# Patient Record
Sex: MALE | Race: WHITE | HISPANIC OR LATINO | Employment: OTHER | RURAL
[De-identification: names, ages, dates, MRNs, and addresses within clinical notes are randomized per-mention and may not be internally consistent; named-entity substitution may affect disease eponyms.]

---

## 2024-05-15 ENCOUNTER — HOSPITAL ENCOUNTER (EMERGENCY)
Facility: HOSPITAL | Age: 64
Discharge: HOME | End: 2024-05-15
Attending: EMERGENCY MEDICINE
Payer: COMMERCIAL

## 2024-05-15 VITALS
SYSTOLIC BLOOD PRESSURE: 154 MMHG | BODY MASS INDEX: 33.86 KG/M2 | OXYGEN SATURATION: 99 % | HEIGHT: 72 IN | HEART RATE: 84 BPM | DIASTOLIC BLOOD PRESSURE: 80 MMHG | RESPIRATION RATE: 18 BRPM | WEIGHT: 250 LBS | TEMPERATURE: 99 F

## 2024-05-15 DIAGNOSIS — N20.1 URETEROLITHIASIS: Primary | ICD-10-CM

## 2024-05-15 DIAGNOSIS — E86.0 DEHYDRATION: ICD-10-CM

## 2024-05-15 LAB
ALBUMIN SERPL BCP-MCNC: 3.9 G/DL (ref 3.5–5)
ALBUMIN/GLOB SERPL: 1.1 {RATIO}
ALP SERPL-CCNC: 115 U/L (ref 45–115)
ALT SERPL W P-5'-P-CCNC: 35 U/L (ref 16–61)
ANION GAP SERPL CALCULATED.3IONS-SCNC: 15 MMOL/L (ref 7–16)
AST SERPL W P-5'-P-CCNC: 29 U/L (ref 15–37)
BACTERIA #/AREA URNS HPF: ABNORMAL /HPF
BASOPHILS # BLD AUTO: 0.04 K/UL (ref 0–0.2)
BASOPHILS NFR BLD AUTO: 0.4 % (ref 0–1)
BILIRUB SERPL-MCNC: 0.8 MG/DL (ref ?–1.2)
BILIRUB UR QL STRIP: ABNORMAL
BUN SERPL-MCNC: 15 MG/DL (ref 7–18)
BUN/CREAT SERPL: 11 (ref 6–20)
CALCIUM CARBONATE CRYSTALS, UA: ABNORMAL /LPF
CALCIUM SERPL-MCNC: 8.5 MG/DL (ref 8.5–10.1)
CAOX CRY #/AREA URNS LPF: ABNORMAL /LPF
CHLORIDE SERPL-SCNC: 102 MMOL/L (ref 98–107)
CLARITY UR: ABNORMAL
CO2 SERPL-SCNC: 25 MMOL/L (ref 21–32)
COLOR UR: ABNORMAL
CREAT SERPL-MCNC: 1.38 MG/DL (ref 0.7–1.3)
DIFFERENTIAL METHOD BLD: ABNORMAL
EGFR (NO RACE VARIABLE) (RUSH/TITUS): 58 ML/MIN/1.73M2
EOSINOPHIL # BLD AUTO: 0.32 K/UL (ref 0–0.5)
EOSINOPHIL NFR BLD AUTO: 3.4 % (ref 1–4)
ERYTHROCYTE [DISTWIDTH] IN BLOOD BY AUTOMATED COUNT: 13.1 % (ref 11.5–14.5)
GLOBULIN SER-MCNC: 3.6 G/DL (ref 2–4)
GLUCOSE SERPL-MCNC: 162 MG/DL (ref 74–106)
GLUCOSE UR STRIP-MCNC: NEGATIVE MG/DL
HCT VFR BLD AUTO: 47.5 % (ref 40–54)
HGB BLD-MCNC: 16.1 G/DL (ref 13.5–18)
KETONES UR STRIP-SCNC: 15 MG/DL
LEUKOCYTE ESTERASE UR QL STRIP: NEGATIVE
LYMPHOCYTES # BLD AUTO: 1.52 K/UL (ref 1–4.8)
LYMPHOCYTES NFR BLD AUTO: 16.4 % (ref 27–41)
MCH RBC QN AUTO: 29.5 PG (ref 27–31)
MCHC RBC AUTO-ENTMCNC: 33.9 G/DL (ref 32–36)
MCV RBC AUTO: 87.2 FL (ref 80–96)
MONOCYTES # BLD AUTO: 0.56 K/UL (ref 0–0.8)
MONOCYTES NFR BLD AUTO: 6 % (ref 2–6)
MPC BLD CALC-MCNC: 10.4 FL (ref 9.4–12.4)
NEUTROPHILS # BLD AUTO: 6.84 K/UL (ref 1.8–7.7)
NEUTROPHILS NFR BLD AUTO: 73.8 % (ref 53–65)
NITRITE UR QL STRIP: NEGATIVE
PH UR STRIP: 5.5 PH UNITS
PLATELET # BLD AUTO: 158 K/UL (ref 150–400)
POTASSIUM SERPL-SCNC: 4.4 MMOL/L (ref 3.5–5.1)
PROT SERPL-MCNC: 7.5 G/DL (ref 6.4–8.2)
PROT UR QL STRIP: 100
RBC # BLD AUTO: 5.45 M/UL (ref 4.6–6.2)
RBC # UR STRIP: ABNORMAL /UL
RBC #/AREA URNS HPF: ABNORMAL /HPF
SODIUM SERPL-SCNC: 138 MMOL/L (ref 136–145)
SP GR UR STRIP: >=1.03
SQUAMOUS #/AREA URNS LPF: ABNORMAL /LPF
UROBILINOGEN UR STRIP-ACNC: 1 MG/DL
WBC # BLD AUTO: 9.28 K/UL (ref 4.5–11)
WBC #/AREA URNS HPF: ABNORMAL /HPF

## 2024-05-15 PROCEDURE — 36415 COLL VENOUS BLD VENIPUNCTURE: CPT | Performed by: EMERGENCY MEDICINE

## 2024-05-15 PROCEDURE — 25000003 PHARM REV CODE 250: Performed by: EMERGENCY MEDICINE

## 2024-05-15 PROCEDURE — C9113 INJ PANTOPRAZOLE SODIUM, VIA: HCPCS | Performed by: EMERGENCY MEDICINE

## 2024-05-15 PROCEDURE — 96375 TX/PRO/DX INJ NEW DRUG ADDON: CPT

## 2024-05-15 PROCEDURE — 99284 EMERGENCY DEPT VISIT MOD MDM: CPT | Mod: ,,, | Performed by: EMERGENCY MEDICINE

## 2024-05-15 PROCEDURE — 81001 URINALYSIS AUTO W/SCOPE: CPT | Performed by: EMERGENCY MEDICINE

## 2024-05-15 PROCEDURE — 99284 EMERGENCY DEPT VISIT MOD MDM: CPT | Mod: 25

## 2024-05-15 PROCEDURE — 85025 COMPLETE CBC W/AUTO DIFF WBC: CPT | Performed by: EMERGENCY MEDICINE

## 2024-05-15 PROCEDURE — 63600175 PHARM REV CODE 636 W HCPCS: Performed by: EMERGENCY MEDICINE

## 2024-05-15 PROCEDURE — 96374 THER/PROPH/DIAG INJ IV PUSH: CPT

## 2024-05-15 PROCEDURE — 96361 HYDRATE IV INFUSION ADD-ON: CPT

## 2024-05-15 PROCEDURE — 80053 COMPREHEN METABOLIC PANEL: CPT | Performed by: EMERGENCY MEDICINE

## 2024-05-15 RX ORDER — DILTIAZEM HYDROCHLORIDE EXTENDED-RELEASE TABLETS 420 MG/1
420 TABLET, EXTENDED RELEASE ORAL DAILY
COMMUNITY
End: 2024-05-15 | Stop reason: CLARIF

## 2024-05-15 RX ORDER — DILTIAZEM HYDROCHLORIDE 120 MG/1
120 CAPSULE, COATED, EXTENDED RELEASE ORAL
COMMUNITY
Start: 2024-04-07

## 2024-05-15 RX ORDER — ONDANSETRON 4 MG/1
4 TABLET, FILM COATED ORAL EVERY 8 HOURS PRN
Qty: 10 TABLET | Refills: 0 | Status: SHIPPED | OUTPATIENT
Start: 2024-05-15 | End: 2024-05-18

## 2024-05-15 RX ORDER — NITROFURANTOIN 25; 75 MG/1; MG/1
100 CAPSULE ORAL 2 TIMES DAILY
Qty: 10 CAPSULE | Refills: 0 | Status: SHIPPED | OUTPATIENT
Start: 2024-05-15 | End: 2024-05-20

## 2024-05-15 RX ORDER — KETOROLAC TROMETHAMINE 30 MG/ML
15 INJECTION, SOLUTION INTRAMUSCULAR; INTRAVENOUS
Status: COMPLETED | OUTPATIENT
Start: 2024-05-15 | End: 2024-05-15

## 2024-05-15 RX ORDER — PANTOPRAZOLE SODIUM 40 MG/1
40 TABLET, DELAYED RELEASE ORAL DAILY
Qty: 14 TABLET | Refills: 0 | Status: SHIPPED | OUTPATIENT
Start: 2024-05-15 | End: 2024-05-29

## 2024-05-15 RX ORDER — TAMSULOSIN HYDROCHLORIDE 0.4 MG/1
0.4 CAPSULE ORAL DAILY
Qty: 14 CAPSULE | Refills: 0 | Status: SHIPPED | OUTPATIENT
Start: 2024-05-15 | End: 2024-05-29

## 2024-05-15 RX ORDER — PANTOPRAZOLE SODIUM 40 MG/10ML
40 INJECTION, POWDER, LYOPHILIZED, FOR SOLUTION INTRAVENOUS
Status: COMPLETED | OUTPATIENT
Start: 2024-05-15 | End: 2024-05-15

## 2024-05-15 RX ORDER — ONDANSETRON HYDROCHLORIDE 2 MG/ML
4 INJECTION, SOLUTION INTRAVENOUS
Status: COMPLETED | OUTPATIENT
Start: 2024-05-15 | End: 2024-05-15

## 2024-05-15 RX ORDER — HYDROCODONE BITARTRATE AND ACETAMINOPHEN 5; 325 MG/1; MG/1
1 TABLET ORAL EVERY 6 HOURS PRN
Qty: 20 TABLET | Refills: 0 | Status: SHIPPED | OUTPATIENT
Start: 2024-05-15 | End: 2024-05-20

## 2024-05-15 RX ADMIN — ONDANSETRON 4 MG: 2 INJECTION INTRAMUSCULAR; INTRAVENOUS at 12:05

## 2024-05-15 RX ADMIN — SODIUM CHLORIDE 1000 ML: 9 INJECTION, SOLUTION INTRAVENOUS at 11:05

## 2024-05-15 RX ADMIN — KETOROLAC TROMETHAMINE 15 MG: 30 INJECTION, SOLUTION INTRAMUSCULAR at 12:05

## 2024-05-15 RX ADMIN — PANTOPRAZOLE SODIUM 40 MG: 40 INJECTION, POWDER, LYOPHILIZED, FOR SOLUTION INTRAVENOUS at 12:05

## 2024-05-15 NOTE — DISCHARGE INSTRUCTIONS
INCREASE REST AND FLUIDS,   MEDICATIONS AS DIRECTED: FLOMAX, MACROBID,PROTONIX, NORCO, ZOFRAN  OVER THE COUNTER    TYLENOL FOR FEVER/PAIN,  DISCUSS ASPIRIN THERAPY WITH PCP LATER  NO SODAS  HEAT TO BACK

## 2024-05-15 NOTE — ED PROVIDER NOTES
"Encounter Date: 5/15/2024       History     Chief Complaint   Patient presents with    Abdominal Pain     C/o severe left lower abdominal pain onset 2 hours PTA, some nausea and diaphoresis     PT IS A 64 YR OLD WM WITH LLQ PAIN ONSET 0845 WHILE DRIVING , PT LIVES IN [place], [place] BUT HAS LAND IN [place], [place] AND VISITS OFTEN.   PT HAS NOTED CHILLS BUT DENIES FEVER, HEMATURIA, N/V/D, PALPITATIONS  (HX SVT ON DILTIAZEM ), SYNCOPE OR URINARY SYMPTOMS.  PT  STATES LAST BM THIS AM AND DESCRIBED AS NORMAL.        PT EATS NUTS " A LOT" AND HAS EATEN NUTS THIS WEEK  PT HAD COLOSCOPY NEG IN THE PAST.  PT HAS HX KIDNEY STONES X 3 WITH LAST 15 YR AGO  REQUIRING SURGERY , HAD RENAL CYST        Review of patient's allergies indicates:   Allergen Reactions    Penicillanic sulfone bl beta-lactamase inhibitors      Past Medical History:   Diagnosis Date    Renal calculi     SVT (supraventricular tachycardia)      Past Surgical History:   Procedure Laterality Date    INGUINAL HERNIA REPAIR Left     renal  calculi       Family History   Problem Relation Name Age of Onset    Hypertension Mother      Hypertension Father      Hypertension Sister       Social History     Tobacco Use    Smoking status: Never     Passive exposure: Never    Smokeless tobacco: Never   Substance Use Topics    Drug use: Never     Review of Systems   Constitutional:  Positive for chills.   HENT: Negative.     Eyes: Negative.    Respiratory: Negative.     Cardiovascular: Negative.    Gastrointestinal:  Positive for abdominal pain.   Endocrine: Negative.    Genitourinary: Negative.    Musculoskeletal: Negative.    Skin:  Positive for pallor.   Allergic/Immunologic: Negative.    Neurological: Negative.    Hematological: Negative.    Psychiatric/Behavioral:  The patient is nervous/anxious.    All other systems reviewed and are negative.      Physical Exam     Initial Vitals [05/15/24 1123]   BP Pulse Resp Temp SpO2   (!) 171/102 77 18 98.7 °F (37.1 °C) 99 %    "   MAP       --         Physical Exam    Nursing note and vitals reviewed.  Constitutional: He appears well-developed and well-nourished. He is cooperative. He appears distressed.   HENT:   Head: Normocephalic and atraumatic.   Right Ear: External ear normal.   Left Ear: External ear normal.   Nose: Nose normal.   Mouth/Throat: Oropharynx is clear and moist. No oropharyngeal exudate.   Eyes: Conjunctivae and EOM are normal. Pupils are equal, round, and reactive to light.   Neck: Trachea normal. Neck supple. No thyromegaly present. No tracheal deviation present.   Normal range of motion.  Cardiovascular:  Normal rate, regular rhythm, normal heart sounds and intact distal pulses.     Exam reveals no gallop and no friction rub.       No murmur heard.  Pulses:       Radial pulses are 3+ on the right side and 3+ on the left side.        Dorsalis pedis pulses are 3+ on the right side and 3+ on the left side.   Pulmonary/Chest: Breath sounds normal. No respiratory distress. He has no wheezes. He has no rales.   Abdominal: Abdomen is soft. Bowel sounds are normal. He exhibits no distension and no mass. There is abdominal tenderness (LLQ). There is no rebound and no guarding.   Musculoskeletal:         General: No tenderness or edema. Normal range of motion.      Right shoulder: Normal.      Left shoulder: Normal.      Right upper arm: Normal.      Left upper arm: Normal.      Right elbow: Normal.      Left elbow: Normal.      Right forearm: Normal.      Left forearm: Normal.      Right wrist: Normal.      Left wrist: Normal.      Right hand: Normal.      Left hand: Normal.      Cervical back: Normal range of motion and neck supple.     Lymphadenopathy:     He has no cervical adenopathy.     He has no axillary adenopathy.   Neurological: He is alert and oriented to person, place, and time. He has normal strength. No cranial nerve deficit or sensory deficit. He displays a negative Romberg sign. GCS eye subscore is 4. GCS  verbal subscore is 5. GCS motor subscore is 6.   Reflex Scores:       Bicep reflexes are 2+ on the right side and 2+ on the left side.       Patellar reflexes are 2+ on the right side and 2+ on the left side.  Skin: Skin is warm and dry. Capillary refill takes less than 2 seconds.   Psychiatric: He has a normal mood and affect. His speech is normal and behavior is normal. Judgment and thought content normal. Cognition and memory are normal.         Medical Screening Exam   See Full Note    ED Course   Procedures  Labs Reviewed   COMPREHENSIVE METABOLIC PANEL - Abnormal; Notable for the following components:       Result Value    Glucose 162 (*)     Creatinine 1.38 (*)     eGFR 58 (*)     All other components within normal limits   CBC WITH DIFFERENTIAL - Abnormal; Notable for the following components:    Neutrophils % 73.8 (*)     Lymphocytes % 16.4 (*)     All other components within normal limits   URINALYSIS, REFLEX TO URINE CULTURE - Abnormal; Notable for the following components:    Protein,  (*)     Ketones, UA 15 (*)     Bilirubin, UA Small (*)     Blood, UA Large (*)     Specific Gravity, UA >=1.030 (*)     All other components within normal limits   URINALYSIS, MICROSCOPIC - Abnormal; Notable for the following components:    RBC, UA Too Numerous To Count (*)     Calcium Oxalate Crystals, UA Few (*)     Calcium Carbonate Crystals Few (*)     All other components within normal limits   CBC W/ AUTO DIFFERENTIAL    Narrative:     The following orders were created for panel order CBC Auto Differential.  Procedure                               Abnormality         Status                     ---------                               -----------         ------                     CBC with Differential[[id number]]       Abnormal            Final result                 Please view results for these tests on the individual orders.          Imaging Results              CT Renal Stone Study Abd Pelvis WO (Final  result)  Result time 05/15/24 12:16:48   Procedure changed from CT Abdomen Pelvis  Without Contrast     Final result by Kit Wade DO (05/15/24 12:16:48)                   Impression:      3 mm stone located within the proximal left-sided ureter resulting in mild left-sided hydroureteronephrosis.      Electronically signed by: Kit Wade  Date:    05/15/2024  Time:    12:16               Narrative:    EXAMINATION:  CT RENAL STONE STUDY ABD PELVIS WO    CLINICAL HISTORY:  LLQ abdominal pain;    COMPARISON:  None.    TECHNIQUE:  CT RENAL STONE STUDY ABD PELVIS WO    Dose reduction:    The CT exam was performed using one or more dose reduction techniques: Automatic exposure control, automated adjustment of the MA and/or kVP according to patient size, or use of iterative reconstruction technique.    FINDINGS:  Lower lobes: Clear.    Cardiac: No effusion.    Abdomen:    Hepatobiliary/gallbladder: No focal liver lesion.  Gallbladder is normal.  No ductal obstruction.    Spleen: Normal for noncontrast technique.    Pancreas: Normal for noncontrast technique.    Adrenal/Genitourinary system: 3 mm stone located within the proximal left-sided ureter resulting in mild left-sided hydroureteronephrosis.    Bowel and Mesentery: There is no evidence for bowel obstruction.    Peritoneum: Normal.    Retroperitoneum: Bilateral perinephric fat stranding    Vasculature: Calcified vascular disease    Reproductive: Normal.    Lymph nodes: No enlarged lymph nodes.    Abdominal wall: Rectus diastasis    Osseous structures: Mild multilevel degenerative change                                    X-Rays:   Independently Interpreted Readings:   Abdomen: Viewed and Other Results - Imaging    Updated   Order   05/15/24 1219  CT Renal Stone Study Abd Pelvis WO  Performed: 05/15/24 1158  Final         Impression: 3 mm stone located within the proximal left-sided ureter resulting in mild left-sided hydroureteronephrosis.  "Electronically signed by: Kit Wade Date: 05/15/2024 Time: 12:16           Medications   sodium chloride 0.9% bolus 1,000 mL 1,000 mL (0 mLs Intravenous Stopped 5/15/24 1245)   ketorolac injection 15 mg (15 mg Intravenous Given 5/15/24 1203)   ondansetron injection 4 mg (4 mg Intravenous Given 5/15/24 1205)   pantoprazole injection 40 mg (40 mg Intravenous Given 5/15/24 1207)     Medical Decision Making  PT IS A 64 YR OLD WM WITH LLQ PAIN ONSET 0845 WHILE DRIVING , PT LIVES IN [place], [place] BUT HAS LAND IN [place], [place] AND VISITS OFTEN.   PT HAS NOTED CHILLS BUT DENIES FEVER, HEMATURIA, N/V/D, PALPITATIONS  (HX SVT ON DILTIAZEM ), SYNCOPE OR URINARY SYMPTOMS.  PT  STATES LAST BM THIS AM AND DESCRIBED AS NORMAL.        PT EATS NUTS " A LOT" AND HAS EATEN NUTS THIS WEEK  PT HAD COLOSCOPY NEG IN THE PAST.  PT HAS HX KIDNEY STONES X 3 WITH LAST 15 YR AGO  REQUIRING SURGERY , HAD RENAL CYST    Amount and/or Complexity of Data Reviewed  Labs: ordered.     Details: Viewed and Other Results - Labs      Updated   Order   05/15/24 1150  CBC Auto Differential  Collected: 05/15/24 1143  Final result  Specimen: Blood         05/15/24 1150  CBC with Differential  Collected: 05/15/24 1143  Final result  Specimen: Blood      WBC 9.28 K/uL  RBC 5.45 M/uL  Hemoglobin 16.1 g/dL  Hematocrit 47.5 %  MCV 87.2 fL  MCH 29.5 pg  MCHC 33.9 g/dL  RDW 13.1 %  Platelet Count 158 K/uL  MPV 10.4 fL  Neutrophils % 73.8 High  %  Lymphocytes % 16.4 Low  %  Neutrophils, Abs 6.84 K/uL  Lymphocytes, Absolute 1.52 K/uL  Diff Type Auto  Monocytes % 6.0 %  Eosinophils % 3.4 %  Basophils % 0.4 %  Monocytes, Absolute 0.56 K/uL  Eosinophils, Absolute 0.32 K/uL  Basophils, Absolute 0.04 K/uL       05/15/24 1202  Urinalysis, Microscopic  Collected: 05/15/24 1138  Final result  Specimen: Urine      WBC, UA 0-5 /hpf  RBC, UA Too Numerous To Count Abnormal  /hpf  Bacteria, UA Rare /hpf  Squamous Epithelial Cells, UA Rare /lpf  Calcium Oxalate " Crystals, UA Few Abnormal  /lpf  Calcium Carbonate Crystals Few Abnormal  /lpf       05/15/24 1201  Urinalysis, Reflex to Urine Culture Urine, Clean Catch  Collected: 05/15/24 1138  Final result  Specimen: Urine      Color, UA Dark Yellow  Clarity, UA Cloudy  pH, UA 5.5 pH Units  Leukocytes, UA Negative  Nitrites, UA Negative  Protein,  Abnormal   Glucose, UA Negative mg/dL  Ketones, UA 15 Abnormal  mg/dL  Urobilinogen, UA 1.0 mg/dL  Bilirubin, UA Small Abnormal   Blood, UA Large Abnormal   Specific Gravity, UA >=1.030 Abnormal        05/15/24 1202  Comprehensive metabolic panel  Collected: 05/15/24 1136  Final result  Specimen: Blood      Sodium 138 mmol/L  Potassium 4.4 mmol/L  Chloride 102 mmol/L  CO2 25 mmol/L  Anion Gap 15 mmol/L  Glucose 162 High  mg/dL  BUN 15 mg/dL  Creatinine 1.38 High  mg/dL  BUN/Creatinine Ratio 11  Calcium 8.5 mg/dL  Total Protein 7.5 g/dL  Albumin 3.9 g/dL  Globulin 3.6 g/dL  A/G Ratio 1.1  Bilirubin, Total 0.8 mg/dL  Alk Phos 115 U/L  ALT 35 U/L  AST 29 U/L  eGFR 58 Low  mL/min/1.73m2            Radiology: ordered.     Details: Viewed and Other Results - Imaging    Updated   Order   05/15/24 1219  CT Renal Stone Study Abd Pelvis WO  Performed: 05/15/24 1158  Final         Impression: 3 mm stone located within the proximal left-sided ureter resulting in mild left-sided hydroureteronephrosis. Electronically signed by: Kit Wade Date: 05/15/2024 Time: 12:16        Discussion of management or test interpretation with external provider(s): EXAM  IVF 1 L NS BOLUS  TORADOL, ZOFRAN, PROTONIX IMPROVED  LABS AS ABOVE  CT ABD 3 MM STONE PROX L URETER WITH MILD HYDRONEPHROSIS  DC HOME IN STABLE CONDITION    Risk  Prescription drug management.                                      Clinical Impression:   Final diagnoses:  [N20.1] Ureterolithiasis (Primary)  [E86.0] Dehydration        ED Disposition Condition    Discharge Stable          ED Prescriptions       Medication Sig Dispense  Start Date End Date Auth. Provider    HYDROcodone-acetaminophen (NORCO) 5-325 mg per tablet () Take 1 tablet by mouth every 6 (six) hours as needed for Pain. 20 tablet 5/15/2024 2024 Nely Anne MD    ondansetron (ZOFRAN) 4 MG tablet () Take 1 tablet (4 mg total) by mouth every 8 (eight) hours as needed for Nausea. 10 tablet 5/15/2024 2024 Nely Anne MD    nitrofurantoin, macrocrystal-monohydrate, (MACROBID) 100 MG capsule () Take 1 capsule (100 mg total) by mouth 2 (two) times daily. for 5 days 10 capsule 5/15/2024 2024 Nely Anne MD    pantoprazole (PROTONIX) 40 MG tablet () Take 1 tablet (40 mg total) by mouth once daily. for 14 days 14 tablet 5/15/2024 2024 Nely Anne MD    tamsulosin (FLOMAX) 0.4 mg Cap () Take 1 capsule (0.4 mg total) by mouth once daily. for 14 days 14 capsule 5/15/2024 2024 Nely Anne MD          Follow-up Information       Follow up With Specialties Details Why Contact Info    PCP IN [place] TO  REFER TO UROLOGY  In 2 days If symptoms worsen TO ER SOONER              Nely Anne MD  24 [id number]